# Patient Record
Sex: FEMALE
[De-identification: names, ages, dates, MRNs, and addresses within clinical notes are randomized per-mention and may not be internally consistent; named-entity substitution may affect disease eponyms.]

---

## 2021-07-08 PROBLEM — Z00.00 ENCOUNTER FOR PREVENTIVE HEALTH EXAMINATION: Status: ACTIVE | Noted: 2021-07-08

## 2021-07-27 ENCOUNTER — APPOINTMENT (OUTPATIENT)
Dept: ORTHOPEDIC SURGERY | Facility: CLINIC | Age: 31
End: 2021-07-27
Payer: OTHER GOVERNMENT

## 2021-07-27 VITALS — HEIGHT: 58 IN | WEIGHT: 116 LBS | BODY MASS INDEX: 24.35 KG/M2 | RESPIRATION RATE: 16 BRPM

## 2021-07-27 DIAGNOSIS — Z78.9 OTHER SPECIFIED HEALTH STATUS: ICD-10-CM

## 2021-07-27 PROCEDURE — 99203 OFFICE O/P NEW LOW 30 MIN: CPT

## 2021-07-27 PROCEDURE — 73110 X-RAY EXAM OF WRIST: CPT | Mod: 50

## 2021-07-27 RX ORDER — LEVONORGESTREL 19.5 MG/1
INTRAUTERINE DEVICE INTRAUTERINE
Refills: 0 | Status: ACTIVE | COMMUNITY

## 2021-08-11 ENCOUNTER — TRANSCRIPTION ENCOUNTER (OUTPATIENT)
Age: 31
End: 2021-08-11

## 2021-08-13 ENCOUNTER — NON-APPOINTMENT (OUTPATIENT)
Age: 31
End: 2021-08-13

## 2021-08-18 ENCOUNTER — APPOINTMENT (OUTPATIENT)
Dept: ORTHOPEDIC SURGERY | Facility: CLINIC | Age: 31
End: 2021-08-18
Payer: OTHER GOVERNMENT

## 2021-08-18 VITALS — WEIGHT: 116 LBS | BODY MASS INDEX: 24.35 KG/M2 | HEIGHT: 58 IN | RESPIRATION RATE: 16 BRPM

## 2021-08-18 DIAGNOSIS — R20.0 ANESTHESIA OF SKIN: ICD-10-CM

## 2021-08-18 DIAGNOSIS — M79.641 PAIN IN RIGHT HAND: ICD-10-CM

## 2021-08-18 PROCEDURE — 99213 OFFICE O/P EST LOW 20 MIN: CPT | Mod: 25

## 2021-08-18 PROCEDURE — 20605 DRAIN/INJ JOINT/BURSA W/O US: CPT | Mod: RT

## 2021-08-18 NOTE — PHYSICAL EXAM
[de-identified] : On exam she has no tenderness whatsoever over the scapholunate ligament.  Morris scaphoid shift test negative.  Tender over volar Pisa form.  Slight tenderness over Pisa triquetral joint.  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  Positive elbow flexion test.  Full intrinsic strength as well as first dorsal interosseous and flexor digitorum profundus to ring and small finger.  Nontender over TFCC. [de-identified] : PA clenched fist view bilaterally demonstrate slightly widened SL more on the left than the right

## 2021-08-18 NOTE — HISTORY OF PRESENT ILLNESS
[Right] : right hand dominant [FreeTextEntry1] : Patient returns for follow-up.  Had several conversations over the phone.  Her EMG demonstrated cubital tunnel.  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  She comes in today for evaluation of this.  Complaining of "instability" and pain in the volar ulnar aspect of her hand as well as numbness and tingling in the ring and small finger.  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 Patient did have MRI done on 08/06/2021 in chart.\par \par MRI reviewed personally of the wrist demonstrating volar scapholunate ligament tear and partial tear of dorsal component.  No Guyon's canal soft tissue mass seen.  Possible small effusion of pisiform triquetral joint\par

## 2021-08-18 NOTE — ASSESSMENT
[FreeTextEntry1] : My impression is that the patient has an incidental finding of scapholunate tear.  Her symptoms are appear to be all pisiform related.  Also likely has cubital tunnel.  Less likely compression distally at Guyon's canal considering the exam today and the EMG.  These symptoms are secondary to her volar ulnar hand pain.  Recommended a pisiform triquetral joint injection.\par \par Recommend that the patient undergo a pisiform triquetral joint injection.  Under informed consent sterile conditions the PT joint was injected with half cc Kenalog combined with 2% plain lidocaine.  We will monitor to see if there is numbness on the dorsum of the ring and small finger.

## 2021-11-29 ENCOUNTER — APPOINTMENT (OUTPATIENT)
Dept: ORTHOPEDIC SURGERY | Facility: CLINIC | Age: 31
End: 2021-11-29